# Patient Record
Sex: FEMALE | Race: WHITE | Employment: FULL TIME | URBAN - METROPOLITAN AREA
[De-identification: names, ages, dates, MRNs, and addresses within clinical notes are randomized per-mention and may not be internally consistent; named-entity substitution may affect disease eponyms.]

---

## 2018-06-10 ENCOUNTER — HOSPITAL ENCOUNTER (OUTPATIENT)
Age: 43
Discharge: HOME | End: 2018-06-10
Payer: COMMERCIAL

## 2018-06-10 VITALS
SYSTOLIC BLOOD PRESSURE: 128 MMHG | TEMPERATURE: 98 F | RESPIRATION RATE: 18 BRPM | BODY MASS INDEX: 33 KG/M2 | HEART RATE: 84 BPM | DIASTOLIC BLOOD PRESSURE: 96 MMHG | WEIGHT: 220 LBS | OXYGEN SATURATION: 97 %

## 2018-06-10 DIAGNOSIS — J06.9 UPPER RESPIRATORY TRACT INFECTION, UNSPECIFIED TYPE: Primary | ICD-10-CM

## 2018-06-10 PROCEDURE — 99213 OFFICE O/P EST LOW 20 MIN: CPT

## 2018-06-10 PROCEDURE — 99212 OFFICE O/P EST SF 10 MIN: CPT

## 2018-06-10 RX ORDER — IBUPROFEN 200 MG
200 TABLET ORAL EVERY 6 HOURS PRN
COMMUNITY

## 2018-06-10 RX ORDER — FLUTICASONE PROPIONATE 50 MCG
1 SPRAY, SUSPENSION (ML) NASAL DAILY
Qty: 16 G | Refills: 0 | Status: SHIPPED | OUTPATIENT
Start: 2018-06-10 | End: 2019-06-10

## 2018-06-10 NOTE — ED INITIAL ASSESSMENT (HPI)
Pt. Has had ear & cold/sinus issues on & off for about 1 month. States the Rt. Ear & headache has come back in the past 3 days. Pt. Denies any recent swimming. States she is in the process of moving to [place].  Saw a [facility] there a couple weeks ago, was [place]

## 2018-06-10 NOTE — ED PROVIDER NOTES
Patient Seen in: [facility]    History   Patient presents with:  Ear Pain: Rt.   Headache (neurologic)  Cough/URI    Stated Complaint: RIGHT EAR PAIN/HEAD ACHE    HPI    CHIEF COMPLAINT: Ear pain, URI symptoms    HISTORY OF PRESE FOR                PAIN MANAGEMENT  8/19/2014: INJECTION, W/WO CONTRAST, DX/THERAPEUTIC SUBST* N/A      Comment: Procedure: LUMBAR EPIDURAL;  Surgeon:                Allie Vale MD;  Location: [place] [id number] MANAGEMENT  7/31/2014:  Torie Esacmilla stridor. No phonation changes, patient handling secretions well. Uvula midline. Ears: Effusions noted bilaterally without signs of serous otitis media. Ear canals normal.  No mastoid erythema or tenderness.   Nose: Inferior turbinates are swollen, boggy encounter diagnosis)    Disposition:  Discharge  6/10/2018  9:45 am    Follow-up:  Darrian Morales MD              Medications Prescribed:  Current Discharge Medication List    START taking these medications    Fluticasone Propionate 50 MCG/ACT Nasal Gerri